# Patient Record
Sex: FEMALE | Race: WHITE | Employment: OTHER | URBAN - NONMETROPOLITAN AREA
[De-identification: names, ages, dates, MRNs, and addresses within clinical notes are randomized per-mention and may not be internally consistent; named-entity substitution may affect disease eponyms.]

---

## 2019-11-21 ENCOUNTER — HOSPITAL ENCOUNTER (OUTPATIENT)
Age: 69
Discharge: HOME | End: 2019-11-23
Payer: COMMERCIAL

## 2019-11-21 ENCOUNTER — HOSPITAL ENCOUNTER (OUTPATIENT)
Dept: GENERAL RADIOLOGY | Age: 69
Discharge: HOME | End: 2019-11-23
Payer: COMMERCIAL

## 2019-11-21 DIAGNOSIS — M54.50 LOW BACK PAIN OF OVER 3 MONTHS DURATION: ICD-10-CM

## 2019-11-21 PROCEDURE — 72100 X-RAY EXAM L-S SPINE 2/3 VWS: CPT

## 2020-01-23 ENCOUNTER — OFFICE VISIT (OUTPATIENT)
Dept: NEUROLOGY | Age: 70
End: 2020-01-23
Payer: COMMERCIAL

## 2020-01-23 VITALS
BODY MASS INDEX: 23.13 KG/M2 | WEIGHT: 143.9 LBS | DIASTOLIC BLOOD PRESSURE: 63 MMHG | TEMPERATURE: 97.7 F | HEART RATE: 57 BPM | SYSTOLIC BLOOD PRESSURE: 107 MMHG | RESPIRATION RATE: 18 BRPM | HEIGHT: 66 IN

## 2020-01-23 PROCEDURE — 99214 OFFICE O/P EST MOD 30 MIN: CPT | Performed by: NEUROMUSCULOSKELETAL MEDICINE, SPORTS MEDICINE

## 2020-01-23 RX ORDER — GABAPENTIN 100 MG/1
100 CAPSULE ORAL 3 TIMES DAILY
Qty: 90 CAPSULE | Refills: 5 | Status: SHIPPED | OUTPATIENT
Start: 2020-01-23 | End: 2020-01-23 | Stop reason: SDUPTHER

## 2020-01-23 RX ORDER — GABAPENTIN 100 MG/1
100 CAPSULE ORAL 3 TIMES DAILY
Qty: 90 CAPSULE | Refills: 5 | Status: SHIPPED | OUTPATIENT
Start: 2020-01-23 | End: 2020-02-22

## 2020-01-23 NOTE — PROGRESS NOTES
absent, balance problem: absent, Dizziness: absent, vertigo: absent, Weakness: absent, Numbness present, Tremor: absent, Spasm: absent, involuntary movement: absent, Speech difficulty: absent, Headache: absent, Light sensitivity: absent   Psychiatric Anxiety: absent, Depression  absent, drug abuse: absent, Hallucination: absent, mood disorder: absent, Suicidal ideations absent   Hematologic Abnormal bleeding: absent, Anemia: absent, Lymph gland changes: absent Clotting disorder: absent     Past Medical History:   Diagnosis Date    Hand pain     MVP (mitral valve prolapse)        Past Surgical History:   Procedure Laterality Date    DILATION AND CURETTAGE OF UTERUS      ROTATOR CUFF REPAIR         Social History     Socioeconomic History    Marital status:      Spouse name: Not on file    Number of children: Not on file    Years of education: Not on file    Highest education level: Not on file   Occupational History    Not on file   Social Needs    Financial resource strain: Not on file    Food insecurity:     Worry: Not on file     Inability: Not on file    Transportation needs:     Medical: Not on file     Non-medical: Not on file   Tobacco Use    Smoking status: Never Smoker    Smokeless tobacco: Never Used   Substance and Sexual Activity    Alcohol use: Yes     Frequency: 2-3 times a week     Drinks per session: 1 or 2     Binge frequency: Never    Drug use: Never    Sexual activity: Not on file   Lifestyle    Physical activity:     Days per week: Not on file     Minutes per session: Not on file    Stress: Not on file   Relationships    Social connections:     Talks on phone: Not on file     Gets together: Not on file     Attends Jew service: Not on file     Active member of club or organization: Not on file     Attends meetings of clubs or organizations: Not on file     Relationship status: Not on file    Intimate partner violence:     Fear of current or ex partner: Not on file

## 2023-10-03 ENCOUNTER — HOSPITAL ENCOUNTER
Dept: HOSPITAL 101 - LAB | Age: 73
Discharge: HOME | End: 2023-10-03
Payer: MEDICARE

## 2023-10-03 DIAGNOSIS — E78.5: Primary | ICD-10-CM

## 2023-10-03 DIAGNOSIS — Z51.81: ICD-10-CM

## 2023-10-03 LAB
ADD MANUAL DIFF: NO
ALANINE AMINOTRANSFERASE: 30 U/L (ref 14–59)
ALBUMIN GLOBULIN RATIO: 1
ALBUMIN LEVEL: 3.7 G/DL (ref 3.4–5)
ALKALINE PHOSPHATASE: 95 U/L (ref 46–116)
ANION GAP: 10.5
ASPARTATE AMINO TRANSFERASE: 26 U/L (ref 15–37)
BLOOD UREA NITROGEN: 19 MG/DL (ref 7–18)
CALCIUM: 9.1 MG/DL (ref 8.5–10.1)
CARBON DIOXIDE: 29.6 MMOL/L (ref 21–32)
CHLORIDE: 104 MMOL/L (ref 98–107)
CHOLESTEROL: 228 MG/DL (ref ?–200)
CO2 BLD-SCNC: 29.6 MMOL/L (ref 21–32)
ESTIMATED GFR (AFRICAN AMERICA: >60 (ref 60–?)
ESTIMATED GFR (NON-AFRICAN AME: >60 (ref 60–?)
GLOBULIN: 3.7 G/DL
GLUCOSE BLD-MCNC: 84 MG/DL (ref 74–106)
HCT VFR BLD CALC: 44.7 % (ref 36–48)
HDL CHOLESTEROL: 88 MG/DL (ref 40–60)
HEMATOCRIT: 44.7 % (ref 36–48)
HEMOGLOBIN: 14.5 G/DL (ref 12–16)
IMMATURE GRANULOCYTES ABS AUTO: 0 10^3/UL (ref 0–0.03)
IMMATURE GRANULOCYTES PCT AUTO: 0 % (ref 0–0.5)
LYMPHOCYTES  ABSOLUTE AUTO: 1.1 10^3/UL (ref 1.2–3.8)
MCV RBC: 102.1 FL (ref 81–99)
MEAN CORPUSCULAR HEMOGLOBIN: 33.1 PG (ref 26.7–34)
MEAN CORPUSCULAR HGB CONC: 32.4 G/DL (ref 29.9–35.2)
MEAN CORPUSCULAR VOLUME: 102.1 FL (ref 81–99)
PLATELET # BLD: 191 10^3/UL (ref 150–450)
PLATELET COUNT: 191 10^3/UL (ref 150–450)
POTASSIUM SERPLBLD-SCNC: 4.1 MMOL/L (ref 3.5–5.1)
POTASSIUM: 4.1 MMOL/L (ref 3.5–5.1)
RED BLOOD COUNT: 4.38 10^6/UL (ref 4.2–5.4)
SODIUM BLD-SCNC: 140 MMOL/L (ref 136–145)
SODIUM: 140 MMOL/L (ref 136–145)
TOTAL PROTEIN: 7.4 G/DL (ref 6.4–8.2)
TRIGLYCERIDES: 43 MG/DL (ref ?–150)
VLDL CHOLESTEROL: 8.6 MG/DL
WBC # BLD: 4.3 10^3/UL (ref 4–11)
WHITE BLOOD COUNT: 4.3 10^3/UL (ref 4–11)

## 2023-10-03 PROCEDURE — 80076 HEPATIC FUNCTION PANEL: CPT

## 2023-10-03 PROCEDURE — 80048 BASIC METABOLIC PNL TOTAL CA: CPT

## 2023-10-03 PROCEDURE — 36415 COLL VENOUS BLD VENIPUNCTURE: CPT

## 2023-10-03 PROCEDURE — 80061 LIPID PANEL: CPT

## 2023-10-03 PROCEDURE — 85025 COMPLETE CBC W/AUTO DIFF WBC: CPT

## 2024-05-28 ENCOUNTER — HOSPITAL ENCOUNTER
Dept: HOSPITAL 101 - NM | Age: 74
Discharge: HOME | End: 2024-05-28
Payer: MEDICARE

## 2024-05-28 DIAGNOSIS — R07.2: Primary | ICD-10-CM

## 2024-05-28 DIAGNOSIS — I34.1: ICD-10-CM

## 2024-05-28 PROCEDURE — 93017 CV STRESS TEST TRACING ONLY: CPT

## 2024-05-28 PROCEDURE — 93306 TTE W/DOPPLER COMPLETE: CPT

## 2024-05-28 PROCEDURE — 78452 HT MUSCLE IMAGE SPECT MULT: CPT

## 2024-05-28 PROCEDURE — A9500 TC99M SESTAMIBI: HCPCS

## 2024-06-10 ENCOUNTER — HOSPITAL ENCOUNTER
Dept: HOSPITAL 101 - LAB | Age: 74
Discharge: HOME | End: 2024-06-10
Payer: MEDICARE

## 2024-06-10 DIAGNOSIS — R00.2: ICD-10-CM

## 2024-06-10 DIAGNOSIS — R07.9: ICD-10-CM

## 2024-06-10 DIAGNOSIS — R06.02: Primary | ICD-10-CM

## 2024-06-10 DIAGNOSIS — R53.1: ICD-10-CM

## 2024-06-10 LAB
ADD MANUAL DIFF: NO
ALANINE AMINOTRANSFERASE: 30 U/L (ref 14–59)
ALBUMIN GLOBULIN RATIO: 1
ALBUMIN LEVEL: 3.5 G/DL (ref 3.4–5)
ALKALINE PHOSPHATASE: 91 U/L (ref 46–116)
ANION GAP: 10.6
ASPARTATE AMINO TRANSFERASE: 25 U/L (ref 15–37)
BLOOD UREA NITROGEN: 27 MG/DL (ref 7–18)
CALCIUM: 9.6 MG/DL (ref 8.5–10.1)
CARBON DIOXIDE: 30.9 MMOL/L (ref 21–32)
CHLORIDE: 106 MMOL/L (ref 98–107)
ESTIMATED GFR (AFRICAN AMERICA: >60 (ref 60–?)
ESTIMATED GFR (NON-AFRICAN AME: >60 (ref 60–?)
GLOBULIN: 3.6 G/DL
GLUCOSE: 97 MG/DL (ref 74–106)
HEMATOCRIT: 42.5 % (ref 36–48)
HEMOGLOBIN: 13.7 G/DL (ref 12–16)
IMMATURE GRANULOCYTES ABS AUTO: 0.01 10^3/UL (ref 0–0.03)
IMMATURE GRANULOCYTES PCT AUTO: 0.2 % (ref 0–0.5)
LYMPHOCYTES  ABSOLUTE AUTO: 1.6 10^3/UL (ref 1.2–3.8)
MCV RBC: 101.4 FL (ref 81–99)
MEAN CORPUSCULAR HEMOGLOBIN: 32.7 PG (ref 26.7–34)
MEAN CORPUSCULAR HGB CONC: 32.2 G/DL (ref 29.9–35.2)
PLATELET COUNT: 195 10^3/UL (ref 150–450)
POTASSIUM: 5.5 MMOL/L (ref 3.5–5.1)
RED BLOOD COUNT: 4.19 10^6/UL (ref 4.2–5.4)
SODIUM: 142 MMOL/L (ref 136–145)
THYROID STIMULATING HORMONE: 1.14 UIU/ML (ref 0.36–3.74)
TOTAL PROTEIN: 7.1 G/DL (ref 6.4–8.2)
WHITE BLOOD COUNT: 5.3 10^3/UL (ref 4–11)

## 2024-06-10 PROCEDURE — 84443 ASSAY THYROID STIM HORMONE: CPT

## 2024-06-10 PROCEDURE — 85652 RBC SED RATE AUTOMATED: CPT

## 2024-06-10 PROCEDURE — 86140 C-REACTIVE PROTEIN: CPT

## 2024-06-10 PROCEDURE — 36415 COLL VENOUS BLD VENIPUNCTURE: CPT

## 2024-06-10 PROCEDURE — 85025 COMPLETE CBC W/AUTO DIFF WBC: CPT

## 2024-06-10 PROCEDURE — 84439 ASSAY OF FREE THYROXINE: CPT

## 2024-06-10 PROCEDURE — 80053 COMPREHEN METABOLIC PANEL: CPT

## 2024-06-11 LAB — C-REACTIVE PROTEIN, CARDIAC: 0.63 MG/L (ref 0–3)

## 2024-06-24 ENCOUNTER — HOSPITAL ENCOUNTER
Dept: HOSPITAL 101 - SLEEP | Age: 74
Discharge: HOME | End: 2024-06-24
Payer: MEDICARE

## 2024-06-24 DIAGNOSIS — G47.33: Primary | ICD-10-CM

## 2024-06-24 PROCEDURE — 95806 SLEEP STUDY UNATT&RESP EFFT: CPT

## 2024-06-25 ENCOUNTER — HOSPITAL ENCOUNTER
Dept: HOSPITAL 101 - CT | Age: 74
Discharge: HOME | End: 2024-06-25
Payer: MEDICARE

## 2024-06-25 DIAGNOSIS — R91.8: ICD-10-CM

## 2024-06-25 DIAGNOSIS — R93.1: Primary | ICD-10-CM

## 2024-06-25 PROCEDURE — 71250 CT THORAX DX C-: CPT

## 2025-01-15 ENCOUNTER — HOSPITAL ENCOUNTER
Dept: HOSPITAL 101 - RAD | Age: 75
Discharge: HOME | End: 2025-01-15
Payer: MEDICARE

## 2025-01-15 DIAGNOSIS — J32.9: Primary | ICD-10-CM

## 2025-01-15 PROCEDURE — 70220 X-RAY EXAM OF SINUSES: CPT

## 2025-05-07 ENCOUNTER — HOSPITAL ENCOUNTER
Dept: HOSPITAL 101 - CARD | Age: 75
Discharge: HOME | End: 2025-05-07
Payer: MEDICARE

## 2025-05-07 DIAGNOSIS — I34.1: Primary | ICD-10-CM

## 2025-05-07 PROCEDURE — 93306 TTE W/DOPPLER COMPLETE: CPT
